# Patient Record
Sex: FEMALE | Race: BLACK OR AFRICAN AMERICAN | NOT HISPANIC OR LATINO | Employment: UNEMPLOYED | URBAN - METROPOLITAN AREA
[De-identification: names, ages, dates, MRNs, and addresses within clinical notes are randomized per-mention and may not be internally consistent; named-entity substitution may affect disease eponyms.]

---

## 2017-03-30 PROBLEM — F20.9 SCHIZOPHRENIA: Status: ACTIVE | Noted: 2017-03-30

## 2017-03-31 PROBLEM — F84.0 AUTISM: Chronic | Status: ACTIVE | Noted: 2017-03-31

## 2017-04-01 PROBLEM — F20.1 DISORGANIZED SCHIZOPHRENIA: Status: ACTIVE | Noted: 2017-03-30

## 2019-01-19 ENCOUNTER — NURSE TRIAGE (OUTPATIENT)
Dept: ADMINISTRATIVE | Facility: CLINIC | Age: 34
End: 2019-01-19

## 2019-01-20 NOTE — TELEPHONE ENCOUNTER
Reason for Disposition   [1] Caller requesting NON-URGENT health information AND [2] PCP's office is the best resource    Protocols used: ST INFORMATION ONLY CALL-A-  Pt called discussing wants getting pregnant, wants boyfriend. Meeting Bambi at birthday party.

## 2019-03-19 PROBLEM — F29 PSYCHOSIS: Status: ACTIVE | Noted: 2019-03-19

## 2019-05-21 PROBLEM — F25.9 SCHIZOAFFECTIVE DISORDER: Status: ACTIVE | Noted: 2019-05-21

## 2019-05-24 ENCOUNTER — PATIENT OUTREACH (OUTPATIENT)
Dept: ADMINISTRATIVE | Facility: CLINIC | Age: 34
End: 2019-05-24

## 2019-05-24 NOTE — TELEPHONE ENCOUNTER
C3 nurse spoke with Alesia Multani's mother, Fabi, for a TCC post hospital discharge follow up call. The patient has a scheduled PSYCH appointment with [facility] on 5/28/2019 @ 7093.    Tania Chapa, RN  [facility]

## 2019-11-13 ENCOUNTER — PATIENT OUTREACH (OUTPATIENT)
Dept: ADMINISTRATIVE | Facility: CLINIC | Age: 34
End: 2019-11-13

## 2019-11-13 NOTE — PATIENT INSTRUCTIONS
Schizophrenia (General Type)  Schizophrenia is a chronic, often disabling mental health disorder that makes functioning in work and society difficult. It is a type of psychosis, and involves perceiving reality differently from those around you. The difference been reality and what you think become blurred in your mind.  The cause of schizophrenia is not yet known. It is believed to be a result of genetic and biological factors (brain chemistry and structure). Schizophrenia does run in families and occurs in about 1% of the adult population. Environmental factors may also have a role in schizophrenia. These may include where you grew up, toxins, and infections.  Symptoms include:  · Hallucinations (seeing or hearing things that are not there)  · Delusions (false beliefs)  · Disorganized thinking and speech  · Social withdrawal  · Severe anxiety  · Feeling unreal  · Paranoia  · Insomnia  · Trouble thinking or concentrating clearly  · Depression, feeling suicidal  · Withdrawal from those around you  Medicines and therapy can help with many of the symptoms and allow for better daily function and quality of life. These medicines take 2 to 4 weeks to start working and 6 to 8 weeks to take full effect.  It is common to feel that you are not ill and that you don't need treatment. It is important to accept the support of friends and family in continuing to take your medicine.  Home care  · Ongoing care and support helps manage this disease. Find a healthcare provider and therapist who meet your needs. Seek help when you feel like your symptoms are getting worse.  · Tell each of your healthcare providers about all of the prescription medicines, over-the-counter medicines, and supplements you take. Certain supplements interact with medicines and can cause dangerous side effects. Ask your pharmacist when you have questions about drug interactions.  · Be sure to take all of your medicine as directed and get regular blood work  to check your medicine level and your overall health. Take the medicines and get the follow-up lab work as prescribed, even if you think you dont need it.  · Seek support from trusted friends or family by talking about your feelings and thoughts. Ask them to help you recognize behavior changes early so you can get help and medicines can be adjusted.  · If you are having trouble managing workplace issues, or caring for yourself because of your schizophrenia, contact your local Americans with Disabilities (ADA) office to see if they can help. The [place] Department of Justice operates a toll-free ADA information line at: 534.890.7051 (voice) or 297-993-9971 (TTY). They can help you locate a local office.  Follow-up care  Follow up with your doctor or therapist , or as advised.  Call 911  Call 911 if you:  · Have suicidal thoughts, a suicide plan, and the means to carry out the plan  · Have trouble breathing  · Are very confused  · Are very drowsy or have trouble awakening  · Feel faint or lose consciousness  · Have rapid heart rate, very low heart rate, or a new irregular heart rate  · Have a seizure  When to seek medical advice  Call your healthcare provider right away if any of these occur:  · Your symptoms are getting worse  · Family or friends express concern over your behavior and ask you to seek help  · Feeling out of control or that you are being controlled by others  · Feeling like you want to harm yourself or another  · Unable to care for yourself  · Worsening hallucinations (hearing voices)  · Hearing voices that are telling you to harm yourself or others  · Worsening depression or anxiety  Date Last Reviewed: 9/29/2015  © 7643-6323 [employer]. [address] [place] [address], [place] [address]. All rights reserved. This information is not intended as a substitute for professional medical care. Always follow your healthcare professional's instructions.

## 2020-03-26 ENCOUNTER — OFFICE VISIT (OUTPATIENT)
Dept: URGENT CARE | Facility: CLINIC | Age: 35
End: 2020-03-26
Payer: MEDICARE

## 2020-03-26 VITALS
HEART RATE: 111 BPM | WEIGHT: 291 LBS | OXYGEN SATURATION: 96 % | RESPIRATION RATE: 18 BRPM | BODY MASS INDEX: 41.66 KG/M2 | HEIGHT: 70 IN | SYSTOLIC BLOOD PRESSURE: 150 MMHG | DIASTOLIC BLOOD PRESSURE: 83 MMHG | TEMPERATURE: 99 F

## 2020-03-26 DIAGNOSIS — L03.317 CELLULITIS OF BUTTOCK: Primary | ICD-10-CM

## 2020-03-26 PROCEDURE — 99203 PR OFFICE/OUTPT VISIT, NEW, LEVL III, 30-44 MIN: ICD-10-PCS | Mod: S$GLB,,, | Performed by: NURSE PRACTITIONER

## 2020-03-26 PROCEDURE — 99203 OFFICE O/P NEW LOW 30 MIN: CPT | Mod: S$GLB,,, | Performed by: NURSE PRACTITIONER

## 2020-03-26 RX ORDER — ILOPERIDONE 1 MG/1
1 TABLET ORAL DAILY
COMMUNITY
Start: 2020-03-25 | End: 2022-05-27

## 2020-03-26 RX ORDER — SULFAMETHOXAZOLE AND TRIMETHOPRIM 800; 160 MG/1; MG/1
1 TABLET ORAL 2 TIMES DAILY
Qty: 20 TABLET | Refills: 0 | Status: SHIPPED | OUTPATIENT
Start: 2020-03-26 | End: 2020-04-05

## 2020-03-26 NOTE — PROGRESS NOTES
"Subjective:       Patient ID: Alesia Multani is a 35 y.o. female.    Vitals:  height is 5' 10" (1.778 m) and weight is 132 kg (291 lb). Her oral temperature is 99.3 °F (37.4 °C). Her blood pressure is 150/83 (abnormal) and her pulse is 111 (abnormal). Her respiration is 18 and oxygen saturation is 96%.     Chief Complaint: Abscess    Pt gets injections of a home medication but has not received one on right buttock in >4 weeks.    Abscess   Chronicity:  New  Onset:  3-5 days ago  Progression Since Onset: worsening  Size:  5-7cm  Abscess location: Right Buttocks.  Associated Symptoms: no fever, no chills, no sweats  Characteristics: painful and swelling    Characteristics: not draining, no itching and no redness    Pain Scale:  6/10  Treatments Tried:  Nothing  Relieved by:  Nothing  Worsened by:  Nothing      Constitution: Negative for chills, fatigue and fever.   HENT: Negative for congestion and sore throat.    Neck: Negative for painful lymph nodes.   Cardiovascular: Negative for chest pain and leg swelling.   Eyes: Negative for double vision and blurred vision.   Respiratory: Negative for cough and shortness of breath.    Gastrointestinal: Negative for nausea, vomiting and diarrhea.   Genitourinary: Negative for dysuria, frequency, urgency and history of kidney stones.   Musculoskeletal: Negative for joint pain, joint swelling, muscle cramps and muscle ache.   Skin: Positive for abscess. Negative for color change, pale, rash, erythema and bruising.   Allergic/Immunologic: Negative for seasonal allergies.   Neurological: Negative for dizziness, history of vertigo, light-headedness, passing out and headaches.   Hematologic/Lymphatic: Negative for swollen lymph nodes.   Psychiatric/Behavioral: Negative for nervous/anxious, sleep disturbance and depression. The patient is not nervous/anxious.        Objective:      Physical Exam   Constitutional: She is oriented to person, place, and time. She appears " well-developed and well-nourished.   HENT:   Head: Normocephalic and atraumatic. Head is without abrasion, without contusion and without laceration.   Right Ear: External ear normal.   Left Ear: External ear normal.   Nose: Nose normal.   Mouth/Throat: Oropharynx is clear and moist and mucous membranes are normal.   Eyes: Pupils are equal, round, and reactive to light. Conjunctivae, EOM and lids are normal.   Neck: Trachea normal, full passive range of motion without pain and phonation normal. Neck supple.   Cardiovascular: Normal rate, regular rhythm and normal heart sounds.   Pulmonary/Chest: Effort normal and breath sounds normal. No stridor. No respiratory distress.   Musculoskeletal: Normal range of motion.   Neurological: She is alert and oriented to person, place, and time.   Skin: Skin is warm, dry, intact, no rash and abscessed. Capillary refill takes less than 2 seconds. abrasion, burn, bruising, erythema and ecchymosis       Psychiatric: She has a normal mood and affect. Her speech is normal and behavior is normal. Judgment and thought content normal. Cognition and memory are normal.   Nursing note and vitals reviewed.        Assessment:       1. Cellulitis of buttock        Plan:     Monitor for worsening s/s, f/u with PCP pr RTC if no improvement in s/s.    Cellulitis of buttock    Other orders  -     sulfamethoxazole-trimethoprim 800-160mg (BACTRIM DS) 800-160 mg Tab; Take 1 tablet by mouth 2 (two) times daily. for 10 days  Dispense: 20 tablet; Refill: 0

## 2020-03-26 NOTE — PATIENT INSTRUCTIONS
Discharge Instructions for Cellulitis  You have been diagnosed with cellulitis. This is an infection in the deepest layer of the skin. In some cases, the infection also affects the muscle. Cellulitis is caused by bacteria. The bacteria can enter the body through broken skin. This can happen with a cut, scratch, animal bite, or an insect bite that has been scratched. You may have been treated in the hospital with antibiotics and fluids. You will likely be given a prescription for antibiotics to take at home. This sheet will help you take care of yourself at home.  Home care  When you are home:  · Take the prescribed antibiotic medicine you are given as directed until it is gone. Take it even if you feel better. It treats the infection and stops it from returning. Not taking all the medicine can make future infections hard to treat.  · Keep the infected area clean.  · When possible, raise the infected area above the level of your heart. This helps keep swelling down.  · Talk with your healthcare provider if you are in pain. Ask what kind of over-the-counter medicine you can take for pain.  · Apply clean bandages as advised.  · Take your temperature once a day for a week.  · Wash your hands often to prevent spreading the infection.  In the future, wash your hands before and after you touch cuts, scratches, or bandages. This will help prevent infection.   When to call your healthcare provider  Call your healthcare provider immediately if you have any of the following:  · Difficulty or pain when moving the joints above or below the infected area  · Discharge or pus draining from the area  · Fever of 100.4°F (38°C) or higher, or as directed by your healthcare provider  · Pain that gets worse in or around the infected   · Redness that gets worse in or around the infected area, particularly if the area of redness expands to a wider area  · Shaking chills  · Swelling of the infected area  · Vomiting   Date Last Reviewed:  8/1/2016  © 3780-6831 [employer]. [address] [place] [address], [place] [address]. All rights reserved. This information is not intended as a substitute for professional medical care. Always follow your healthcare professional's instructions.    Please arrange follow up with your primary medical clinic as soon as possible. You must understand that you've received an Urgent Care treatment only and that you may be released before all of your medical problems are known or treated. You, the patient, will arrange for follow up as instructed. If your symptoms worsen or fail to improve you should go to the Emergency Room.

## 2020-10-04 ENCOUNTER — NURSE TRIAGE (OUTPATIENT)
Dept: ADMINISTRATIVE | Facility: CLINIC | Age: 35
End: 2020-10-04

## 2020-10-05 NOTE — TELEPHONE ENCOUNTER
Calling because she wants to get  and she need a job. Psychiatric patient calling line with no significant complaint.    Reason for Disposition   Nursing judgment    Protocols used: NO GUIDELINE OR REFERENCE [id number]

## 2021-04-08 ENCOUNTER — CLINICAL SUPPORT (OUTPATIENT)
Dept: URGENT CARE | Facility: CLINIC | Age: 36
End: 2021-04-08
Payer: MEDICARE

## 2021-04-08 VITALS — TEMPERATURE: 98 F

## 2021-04-08 DIAGNOSIS — Z11.52 ENCOUNTER FOR SCREENING FOR COVID-19: Primary | ICD-10-CM

## 2021-04-08 LAB
CTP QC/QA: YES
SARS-COV-2 RDRP RESP QL NAA+PROBE: NEGATIVE

## 2023-02-14 ENCOUNTER — NURSE TRIAGE (OUTPATIENT)
Dept: ADMINISTRATIVE | Facility: CLINIC | Age: 38
End: 2023-02-14
Payer: MEDICARE

## 2023-02-14 NOTE — TELEPHONE ENCOUNTER
"Pt is transferred to this NT by Pt Access. Pt Access states, "She has been going on for about 10 minutes about a man she is in love with." Pt is not making sense to NT over the phone. She jumps from idea to idea and sounds as though as she is not completely mentally oriented. Pt states, "I need to have sex right now but it has to be with a clean man. I haven't had my period since I was 23 and I think I might be pregnant." Pt asks, "Can you please three-way call my Mother? I need to talk to her."     While NT attempts to reach pt's Mother, NT colleague calls 911 on behalf of pt is advised that a unit is being dispatched to pt's address. Pt's Mother advises "I think she is just bored. She does this when she's bored and she calls any number she can. She even tries to call famous people." Pt's Mother advises that pt has had "burning" with urination recently.     Pt disconnects call. Pt's Mother is instructed to call back if pt develops any new/worsening sxs, or if she has any additional questions or concerns. She is again advised that EMS is en route to pt. Pt's Mother verbalizes understanding.   Reason for Disposition   [1] Patient is very confused (disoriented, slurred speech) AND [2] no other adult (e.g., friend or family member) available    Additional Information   Negative: Patient attempted suicide   Negative: Patient is threatening suicide now   Negative: Hearing voices that are telling patient to commit suicide now   Negative: Violent behavior, or threatening to physically hurt or kill someone   Negative: Hearing voices that are telling patient to kill a specific person    Protocols used: Schizophrenia-A-AH    "

## 2023-03-27 ENCOUNTER — OFFICE VISIT (OUTPATIENT)
Dept: URGENT CARE | Facility: CLINIC | Age: 38
End: 2023-03-27
Payer: MEDICARE

## 2023-03-27 VITALS
HEIGHT: 70 IN | TEMPERATURE: 98 F | WEIGHT: 293 LBS | HEART RATE: 96 BPM | DIASTOLIC BLOOD PRESSURE: 85 MMHG | RESPIRATION RATE: 18 BRPM | SYSTOLIC BLOOD PRESSURE: 146 MMHG | OXYGEN SATURATION: 96 % | BODY MASS INDEX: 41.95 KG/M2

## 2023-03-27 DIAGNOSIS — J06.9 URI WITH COUGH AND CONGESTION: Primary | ICD-10-CM

## 2023-03-27 DIAGNOSIS — R05.9 COUGH, UNSPECIFIED TYPE: ICD-10-CM

## 2023-03-27 LAB
CTP QC/QA: YES
SARS-COV-2 AG RESP QL IA.RAPID: NEGATIVE

## 2023-03-27 PROCEDURE — 99203 OFFICE O/P NEW LOW 30 MIN: CPT | Mod: S$GLB,,, | Performed by: PHYSICIAN ASSISTANT

## 2023-03-27 PROCEDURE — 87811 SARS-COV-2 COVID19 W/OPTIC: CPT | Mod: QW,S$GLB,, | Performed by: PHYSICIAN ASSISTANT

## 2023-03-27 PROCEDURE — 87811 SARS CORONAVIRUS 2 ANTIGEN POCT, MANUAL READ: ICD-10-PCS | Mod: QW,S$GLB,, | Performed by: PHYSICIAN ASSISTANT

## 2023-03-27 PROCEDURE — 99203 PR OFFICE/OUTPT VISIT, NEW, LEVL III, 30-44 MIN: ICD-10-PCS | Mod: S$GLB,,, | Performed by: PHYSICIAN ASSISTANT

## 2023-03-27 RX ORDER — DEXBROMPHENIRAMINE MALEATE, PHENYLEPHRINE HYDROCHLORIDE 2; 7.5 MG/1; MG/1
1 TABLET ORAL EVERY 4 HOURS PRN
Qty: 20 TABLET | Refills: 0 | Status: ON HOLD | OUTPATIENT
Start: 2023-03-27 | End: 2023-07-18 | Stop reason: HOSPADM

## 2023-03-27 RX ORDER — PROMETHAZINE HYDROCHLORIDE 6.25 MG/5ML
25 SYRUP ORAL EVERY 6 HOURS PRN
Qty: 473 ML | Refills: 0 | Status: ON HOLD | OUTPATIENT
Start: 2023-03-27 | End: 2023-07-18 | Stop reason: HOSPADM

## 2023-03-27 RX ORDER — GUAIFENESIN AND DEXTROMETHORPHAN HYDROBROMIDE 1200; 60 MG/1; MG/1
1 TABLET, EXTENDED RELEASE ORAL EVERY 12 HOURS PRN
Qty: 12 TABLET | Refills: 0 | Status: ON HOLD | OUTPATIENT
Start: 2023-03-27 | End: 2023-07-18 | Stop reason: HOSPADM

## 2023-03-27 NOTE — PROGRESS NOTES
"Subjective:       Patient ID: Alesia Multani is a 38 y.o. female.    Vitals:  height is 5' 10" (1.778 m) and weight is 146.5 kg (323 lb) (abnormal). Her oral temperature is 97.9 °F (36.6 °C). Her blood pressure is 146/85 (abnormal) and her pulse is 96. Her respiration is 18 and oxygen saturation is 96%.     Chief Complaint: Sinus Problem    Patient provider note starts here:  Patient presents with mother with complaints of URI like symptoms for the past 3-4 days.  Reports nasal congestion, cough productive of yellow sputum, sore throat.  No documented fevers.  Denies chest pain or shortness of breath.  No known ill contacts but mother does have conjunctivitis.  She has taken vitamin B12 and zinc for these symptoms.       Sinus Problem  This is a new problem. The current episode started in the past 7 days (4 days ago). The problem is unchanged. There has been no fever. Her pain is at a severity of 3/10. The pain is mild. Associated symptoms include congestion, coughing, ear pain, headaches and a sore throat. Pertinent negatives include no neck pain or shortness of breath. Treatments tried: nose spray, vitamin c, allegra. The treatment provided no relief.     Constitution: Negative for fever.   HENT:  Positive for ear pain, congestion and sore throat. Negative for ear discharge.    Neck: Negative for neck pain.   Cardiovascular:  Negative for chest pain, palpitations and sob on exertion.   Respiratory:  Positive for cough and sputum production. Negative for chest tightness, shortness of breath and wheezing.    Gastrointestinal:  Negative for abdominal pain, vomiting and diarrhea.   Skin:  Negative for color change and wound.   Neurological:  Positive for headaches. Negative for numbness and tingling.     Objective:      Physical Exam   Constitutional: She is oriented to person, place, and time. She appears well-developed. She is cooperative.  Non-toxic appearance. She does not appear ill. No distress. obesity  HENT: "   Head: Normocephalic and atraumatic.   Ears:   Right Ear: Hearing, tympanic membrane, external ear and ear canal normal.   Left Ear: Hearing, tympanic membrane, external ear and ear canal normal.   Nose: Congestion present. No mucosal edema, rhinorrhea or nasal deformity. No epistaxis. Right sinus exhibits no maxillary sinus tenderness and no frontal sinus tenderness. Left sinus exhibits no maxillary sinus tenderness and no frontal sinus tenderness.   Mouth/Throat: Uvula is midline and mucous membranes are normal. No trismus in the jaw. Normal dentition. No uvula swelling. Posterior oropharyngeal erythema present. No oropharyngeal exudate or posterior oropharyngeal edema.   Eyes: Conjunctivae and lids are normal. No scleral icterus.   Neck: Trachea normal and phonation normal. Neck supple. No edema present. No erythema present. No neck rigidity present.   Cardiovascular: Normal rate, regular rhythm, normal heart sounds and normal pulses.   Pulmonary/Chest: Effort normal and breath sounds normal. No respiratory distress. She has no decreased breath sounds. She has no wheezes. She has no rhonchi.   Abdominal: Normal appearance.   Musculoskeletal: Normal range of motion.         General: No deformity. Normal range of motion.   Lymphadenopathy:     She has no cervical adenopathy.   Neurological: She is alert and oriented to person, place, and time. She exhibits normal muscle tone. Coordination normal.   Skin: Skin is warm, dry, intact, not diaphoretic and not pale.   Psychiatric: Her speech is normal and behavior is normal. Judgment and thought content normal.   Nursing note and vitals reviewed.      Assessment:       1. URI with cough and congestion    2. Cough, unspecified type        Results for orders placed or performed in visit on 03/27/23   SARS Coronavirus 2 Antigen, POCT Manual Read   Result Value Ref Range    SARS Coronavirus 2 Antigen Negative Negative     Acceptable Yes        Plan:          URI with cough and congestion  -     dexbrompheniramine-phenyleph (ALAHIST PE) 2-7.5 mg Tab; Take 1 tablet by mouth every 4 (four) hours as needed (Congestion).  Dispense: 20 tablet; Refill: 0  -     dextromethorphan-guaiFENesin (MUCINEX DM) 60-1,200 mg per 12 hr tablet; Take 1 tablet by mouth every 12 (twelve) hours as needed (COUGH).  Dispense: 12 tablet; Refill: 0  -     promethazine (PHENERGAN) 6.25 mg/5 mL syrup; Take 20 mLs (25 mg total) by mouth every 6 (six) hours as needed (COUGH).  Dispense: 473 mL; Refill: 0    Cough, unspecified type  -     SARS Coronavirus 2 Antigen, POCT Manual Read           Medical Decision Making:   History:   Old Medical Records: I decided to obtain old medical records.  Differential Diagnosis:   Differential diagnosis includes but is not limited to: viral vs bacterial URI, pharyngitis, otitis, COVID 19, influenza, pneumonia.    Clinical Tests:   Lab Tests: Ordered and Reviewed       <> Summary of Lab: COVID negative  Urgent Care Management:  I have reviewed past medical records including labs and imaging to evaluate for trends and previous treatments for related symptoms.   Patient presents with mother with complaints of URI like symptoms for the past 3-4 days.  On exam, she is afebrile and nontoxic in appearance.  Lungs are clear to auscultation bilaterally and vital signs are stable.  She does have nasal congestion.  Posterior oropharynx is erythematous.  COVID negative.  Likely viral in etiology and mother also has conjunctivitis which is likely related to the same virus.  Advised close follow-up with primary care.  Symptomatic treatment prescribed.  ED precautions discussed.  Mother and patient both verbalized understanding and agreed with plan.       Patient Instructions   You must understand that you've received an Urgent Care treatment only and that you may be released before all your medical problems are known or treated. You, the patient, will arrange for follow up  care as instructed.      Follow up with your PCP or specialty clinic as instructed in the next 2-3 days if not improved or as needed. You can call [phone] to schedule an appointment with appropriate provider.      If you condition worsens, we recommend that you receive another evaluation at the emergency room immediately or contact your primary medical clinic's after hours call service to discuss your concerns.      Please return here or go to the Emergency Department for any concerns or worsening condition.      If you were prescribed a narcotic or controlled substance, do not drive or operate heavy equipment or machinery while taking these medications.

## 2023-03-27 NOTE — PATIENT INSTRUCTIONS
You must understand that you've received an Urgent Care treatment only and that you may be released before all your medical problems are known or treated. You, the patient, will arrange for follow up care as instructed.      Follow up with your PCP or specialty clinic as instructed in the next 2-3 days if not improved or as needed. You can call [phone] to schedule an appointment with appropriate provider.      If you condition worsens, we recommend that you receive another evaluation at the emergency room immediately or contact your primary medical clinic's after hours call service to discuss your concerns.      Please return here or go to the Emergency Department for any concerns or worsening condition.      If you were prescribed a narcotic or controlled substance, do not drive or operate heavy equipment or machinery while taking these medications.

## 2023-03-27 NOTE — LETTER
March 27, 2023      [facility] - [facility]  [address] [address], [address]  [place] [address]  Phone: [phone]  Fax: [phone]       Patient: Alesia Multani   YOB: 1985  Date of Visit: 03/27/2023    To Whom It May Concern:    Manuela Multani  was at [facility] on 03/27/2023. She may return to work/school on 3/28/2023 with no restrictions. If you have any questions or concerns, or if I can be of further assistance, please do not hesitate to contact me.    Sincerely,    Misty Spencer PA-C

## 2023-03-29 ENCOUNTER — OFFICE VISIT (OUTPATIENT)
Dept: URGENT CARE | Facility: CLINIC | Age: 38
End: 2023-03-29
Payer: MEDICARE

## 2023-03-29 VITALS
SYSTOLIC BLOOD PRESSURE: 111 MMHG | RESPIRATION RATE: 20 BRPM | WEIGHT: 293 LBS | HEART RATE: 101 BPM | DIASTOLIC BLOOD PRESSURE: 68 MMHG | HEIGHT: 70 IN | OXYGEN SATURATION: 95 % | BODY MASS INDEX: 41.95 KG/M2 | TEMPERATURE: 98 F

## 2023-03-29 DIAGNOSIS — N92.6 IRREGULAR MENSES: ICD-10-CM

## 2023-03-29 DIAGNOSIS — J06.9 URI WITH COUGH AND CONGESTION: Primary | ICD-10-CM

## 2023-03-29 DIAGNOSIS — J02.9 SORE THROAT: ICD-10-CM

## 2023-03-29 LAB
B-HCG UR QL: NEGATIVE
CTP QC/QA: YES
CTP QC/QA: YES
MOLECULAR STREP A: NEGATIVE

## 2023-03-29 PROCEDURE — 87651 POCT STREP A MOLECULAR: ICD-10-PCS | Mod: QW,S$GLB,, | Performed by: NURSE PRACTITIONER

## 2023-03-29 PROCEDURE — 81025 URINE PREGNANCY TEST: CPT | Mod: S$GLB,,, | Performed by: NURSE PRACTITIONER

## 2023-03-29 PROCEDURE — 81025 POCT URINE PREGNANCY: ICD-10-PCS | Mod: S$GLB,,, | Performed by: NURSE PRACTITIONER

## 2023-03-29 PROCEDURE — 87651 STREP A DNA AMP PROBE: CPT | Mod: QW,S$GLB,, | Performed by: NURSE PRACTITIONER

## 2023-03-29 PROCEDURE — 99213 OFFICE O/P EST LOW 20 MIN: CPT | Mod: S$GLB,,, | Performed by: NURSE PRACTITIONER

## 2023-03-29 PROCEDURE — 99213 PR OFFICE/OUTPT VISIT, EST, LEVL III, 20-29 MIN: ICD-10-PCS | Mod: S$GLB,,, | Performed by: NURSE PRACTITIONER

## 2023-03-29 NOTE — LETTER
March 29, 2023      [facility] - [facility]gent Care  [address] [address], [address]  [place] [address]  Phone: [phone]  Fax: [phone]       Patient: Alesia Multani   YOB: 1985  Date of Visit: 03/29/2023    To Whom It May Concern:    Manuela Multani  was at [facility] on 03/29/2023. The patient may return to school on 04/03/2023 with norestrictions. If you have any questions or concerns, or if I can be of further assistance, please do not hesitate to contact me.    Sincerely,    Gabino Marti DNP FNP-C

## 2023-03-29 NOTE — PROGRESS NOTES
"Subjective:      Patient ID: Alesia Multani is a 38 y.o. female.    Vitals:  height is 5' 10" (1.778 m) and weight is 146.5 kg (323 lb) (abnormal). Her oral temperature is 98 °F (36.7 °C). Her blood pressure is 111/68 and her pulse is 101. Her respiration is 20 and oxygen saturation is 95%.     Chief Complaint: Sore Throat    Pt was seen here 2 days ago for same issue. Pt said her throat pain is getting worse and also complains of right eye pain. Pt has been taking prescription cough syrup but did not get the other 2 medications.  Denies SOB, chest pain, fever, or chills.  Of note, patient is requesting a urine pregnancy test secondary to reporting abnormal menses.    Sore Throat   This is a recurrent problem. Episode onset: 5 days ago. The problem has been gradually worsening. The pain is worse on the right side. There has been no fever. The pain is at a severity of 5/10. The pain is moderate. Associated symptoms include congestion, coughing and ear pain. Pertinent negatives include no shortness of breath. Treatments tried: prescription cough medication.     Constitution: Negative for fever.   HENT:  Positive for ear pain, congestion and sore throat.    Eyes:  Positive for eye pain (right eye) and eyelid swelling. Negative for eye discharge and eye itching.   Respiratory:  Positive for cough. Negative for bloody sputum, shortness of breath and wheezing.     Objective:     Physical Exam   Constitutional: She is oriented to person, place, and time. She appears well-developed. She is cooperative.  Non-toxic appearance. She does not appear ill. No distress.   HENT:   Head: Normocephalic and atraumatic.   Ears:   Right Ear: Hearing, tympanic membrane, external ear and ear canal normal.   Left Ear: Hearing, tympanic membrane, external ear and ear canal normal.   Nose: Nose normal. No mucosal edema, rhinorrhea or nasal deformity. No epistaxis. Right sinus exhibits no maxillary sinus tenderness and no frontal sinus " tenderness. Left sinus exhibits no maxillary sinus tenderness and no frontal sinus tenderness.   Mouth/Throat: Uvula is midline, oropharynx is clear and moist and mucous membranes are normal. Mucous membranes are moist. No trismus in the jaw. Normal dentition. No uvula swelling. No oropharyngeal exudate, posterior oropharyngeal edema or posterior oropharyngeal erythema.   Eyes: Conjunctivae and lids are normal. No scleral icterus.   Neck: Trachea normal and phonation normal. Neck supple. No edema present. No erythema present. No neck rigidity present.   Cardiovascular: Normal rate, regular rhythm, normal heart sounds and normal pulses.   Pulmonary/Chest: Effort normal and breath sounds normal. No respiratory distress. She has no decreased breath sounds. She has no rhonchi.   Abdominal: Normal appearance.   Musculoskeletal: Normal range of motion.         General: No deformity. Normal range of motion.   Neurological: She is alert and oriented to person, place, and time. She exhibits normal muscle tone. Coordination normal.   Skin: Skin is warm, dry, intact, not diaphoretic and not pale.   Psychiatric: Her speech is normal and behavior is normal. Judgment and thought content normal.   Nursing note and vitals reviewed.    Assessment:     1. URI with cough and congestion    2. Sore throat    3. Irregular menses         Results for orders placed or performed in visit on 03/29/23   POCT Strep A, Molecular   Result Value Ref Range    Molecular Strep A, POC Negative Negative     Acceptable Yes    POCT urine pregnancy   Result Value Ref Range    POC Preg Test, Ur Negative Negative     Acceptable Yes         Plan:   --Continue medications:  Alahist PE one tablet every 4 hours as needed  Mucinex one tablet every 12 hours as needed for congestion.  --Maintain hydration.    URI with cough and congestion    Sore throat  -     POCT Strep A, Molecular    Irregular menses  -     POCT urine  pregnancy

## 2023-04-04 ENCOUNTER — PATIENT MESSAGE (OUTPATIENT)
Dept: RESEARCH | Facility: HOSPITAL | Age: 38
End: 2023-04-04
Payer: MEDICARE

## 2023-04-11 ENCOUNTER — PATIENT MESSAGE (OUTPATIENT)
Dept: RESEARCH | Facility: HOSPITAL | Age: 38
End: 2023-04-11
Payer: MEDICARE

## 2023-05-30 ENCOUNTER — TELEPHONE (OUTPATIENT)
Dept: OBSTETRICS AND GYNECOLOGY | Facility: CLINIC | Age: 38
End: 2023-05-30
Payer: MEDICARE

## 2023-05-30 NOTE — TELEPHONE ENCOUNTER
Returned pt call vm is full will reach out via portal to inform her of ride set up for her upcoming appt.

## 2023-05-30 NOTE — TELEPHONE ENCOUNTER
----- Message from Sayda Richards sent at 5/24/2023  3:26 PM CDT -----  Regarding: Requesting a call  Contact: ALESIA MULTANI [[id number]]  Name of Who is Calling: Alesia Multani            What is the request in detail: Pt states she is requesting a ride to be set up for her upcoming appt. Please advise           Can the clinic reply by [facility]: No           [facility]: [phone]

## 2023-06-14 ENCOUNTER — TELEPHONE (OUTPATIENT)
Dept: OBSTETRICS AND GYNECOLOGY | Facility: CLINIC | Age: 38
End: 2023-06-14
Payer: MEDICARE

## 2023-06-14 NOTE — TELEPHONE ENCOUNTER
----- Message from Ivy Dennis PA-C sent at 6/5/2023  4:32 PM CDT -----  Regarding: Please Reschedule Patient  Called patient and she states she came to our office on the 5th floor and was told she did not have an appointment, and that her appointment is on 6/23 at the [facility]. However, I do not see any of this on file. Please call the patient tomorrow to confirm correct appointment date, time, and location that works for her. Thank you

## 2024-02-27 PROBLEM — F29 PSYCHOSIS: Status: RESOLVED | Noted: 2019-03-19 | Resolved: 2024-02-27

## 2024-06-13 PROBLEM — K59.00 CONSTIPATION: Status: ACTIVE | Noted: 2024-06-13

## 2024-06-13 PROBLEM — E66.01 MORBID OBESITY: Status: ACTIVE | Noted: 2023-05-16

## 2024-06-14 PROBLEM — E78.6 LOW HDL (UNDER 40): Status: ACTIVE | Noted: 2024-06-14

## 2024-08-14 PROBLEM — R45.850 HOMICIDAL IDEATION: Status: ACTIVE | Noted: 2024-08-14

## 2024-09-06 PROBLEM — F79 INTELLECTUAL DISABILITY: Chronic | Status: ACTIVE | Noted: 2024-09-06

## 2024-09-06 PROBLEM — F79 INTELLECTUAL DISABILITY: Status: ACTIVE | Noted: 2024-09-06

## 2024-10-31 PROBLEM — R03.0 ELEVATED BP WITHOUT DIAGNOSIS OF HYPERTENSION: Status: ACTIVE | Noted: 2024-10-31

## 2024-10-31 PROBLEM — R79.89 ABNORMAL TSH: Status: ACTIVE | Noted: 2024-10-31

## 2024-11-19 ENCOUNTER — PATIENT MESSAGE (OUTPATIENT)
Dept: RESEARCH | Facility: HOSPITAL | Age: 39
End: 2024-11-19
Payer: MEDICARE

## 2024-11-27 ENCOUNTER — PATIENT MESSAGE (OUTPATIENT)
Dept: RESEARCH | Facility: HOSPITAL | Age: 39
End: 2024-11-27
Payer: MEDICARE

## 2025-01-07 PROBLEM — F20.1 DISORGANIZED SCHIZOPHRENIA: Status: RESOLVED | Noted: 2017-03-30 | Resolved: 2025-01-07

## 2025-06-14 ENCOUNTER — NURSE TRIAGE (OUTPATIENT)
Dept: ADMINISTRATIVE | Facility: CLINIC | Age: 40
End: 2025-06-14
Payer: MEDICARE

## 2025-06-14 NOTE — TELEPHONE ENCOUNTER
"Pt called from [facility]. Pt acting very confused. She is stating information about possibly being pregnant then she talks about her nephew not being parson, caller then jumps to being constipated. Caller not making any sense and appears confused to triager. Other staff member alerted EMS for wellness check. Triage nurse talking with patient and awaiting EMS arrival. Nurse hung up phone when the family member walked in the room and asked "did you call the ambulance". Pt stated "yes". Triager disconnected the call.  According to care advice, call EMS now.  Reason for Disposition   Nursing judgment    Protocols used: No Guideline or Reference [id number]    "